# Patient Record
Sex: FEMALE | Race: BLACK OR AFRICAN AMERICAN | NOT HISPANIC OR LATINO | URBAN - METROPOLITAN AREA
[De-identification: names, ages, dates, MRNs, and addresses within clinical notes are randomized per-mention and may not be internally consistent; named-entity substitution may affect disease eponyms.]

---

## 2021-12-09 ENCOUNTER — EMERGENCY (EMERGENCY)
Facility: HOSPITAL | Age: 36
LOS: 1 days | Discharge: HOME | End: 2021-12-09
Attending: EMERGENCY MEDICINE | Admitting: EMERGENCY MEDICINE
Payer: COMMERCIAL

## 2021-12-09 VITALS
RESPIRATION RATE: 18 BRPM | WEIGHT: 207.23 LBS | HEART RATE: 94 BPM | OXYGEN SATURATION: 100 % | TEMPERATURE: 99 F | DIASTOLIC BLOOD PRESSURE: 80 MMHG | SYSTOLIC BLOOD PRESSURE: 119 MMHG

## 2021-12-09 PROCEDURE — 99236 HOSP IP/OBS SAME DATE HI 85: CPT

## 2021-12-09 NOTE — ED ADULT TRIAGE NOTE - CHIEF COMPLAINT QUOTE
Pt here for a PCR COVID test. Had a positive rapid COVID-19 test today. Pt came back from [place] 11/28. C/o congestion. Denies SOB, chest pain, headache, dizziness, nausea, vomiting, diarrhea. PMH pre-diabetic

## 2021-12-10 VITALS
DIASTOLIC BLOOD PRESSURE: 58 MMHG | HEART RATE: 77 BPM | RESPIRATION RATE: 20 BRPM | OXYGEN SATURATION: 100 % | SYSTOLIC BLOOD PRESSURE: 112 MMHG | TEMPERATURE: 99 F

## 2021-12-10 DIAGNOSIS — Z91.19 PATIENT'S NONCOMPLIANCE WITH OTHER MEDICAL TREATMENT AND REGIMEN: ICD-10-CM

## 2021-12-10 DIAGNOSIS — R73.9 HYPERGLYCEMIA, UNSPECIFIED: ICD-10-CM

## 2021-12-10 DIAGNOSIS — E11.9 TYPE 2 DIABETES MELLITUS WITHOUT COMPLICATIONS: ICD-10-CM

## 2021-12-10 DIAGNOSIS — Z20.822 CONTACT WITH AND (SUSPECTED) EXPOSURE TO COVID-19: ICD-10-CM

## 2021-12-10 LAB
A1C WITH ESTIMATED AVERAGE GLUCOSE RESULT: 10.7 % — HIGH (ref 4–5.6)
ALBUMIN SERPL ELPH-MCNC: 3.7 G/DL — SIGNIFICANT CHANGE UP (ref 3.3–5)
ALP SERPL-CCNC: 69 U/L — SIGNIFICANT CHANGE UP (ref 40–120)
ALT FLD-CCNC: 30 U/L — SIGNIFICANT CHANGE UP (ref 4–33)
ANION GAP SERPL CALC-SCNC: 12 MMOL/L — SIGNIFICANT CHANGE UP (ref 7–14)
ANION GAP SERPL CALC-SCNC: 13 MMOL/L — SIGNIFICANT CHANGE UP (ref 7–14)
AST SERPL-CCNC: 31 U/L — SIGNIFICANT CHANGE UP (ref 4–32)
B-OH-BUTYR SERPL-SCNC: 0.4 MMOL/L — SIGNIFICANT CHANGE UP (ref 0–0.4)
B-OH-BUTYR SERPL-SCNC: 0.6 MMOL/L — HIGH (ref 0–0.4)
BASOPHILS # BLD AUTO: 0.02 K/UL — SIGNIFICANT CHANGE UP (ref 0–0.2)
BASOPHILS NFR BLD AUTO: 0.3 % — SIGNIFICANT CHANGE UP (ref 0–2)
BILIRUB SERPL-MCNC: 0.4 MG/DL — SIGNIFICANT CHANGE UP (ref 0.2–1.2)
BLOOD GAS VENOUS COMPREHENSIVE RESULT: SIGNIFICANT CHANGE UP
BLOOD GAS VENOUS COMPREHENSIVE RESULT: SIGNIFICANT CHANGE UP
BUN SERPL-MCNC: 7 MG/DL — SIGNIFICANT CHANGE UP (ref 7–23)
BUN SERPL-MCNC: 9 MG/DL — SIGNIFICANT CHANGE UP (ref 7–23)
CALCIUM SERPL-MCNC: 8.4 MG/DL — SIGNIFICANT CHANGE UP (ref 8.4–10.5)
CALCIUM SERPL-MCNC: 9.1 MG/DL — SIGNIFICANT CHANGE UP (ref 8.4–10.5)
CHLORIDE SERPL-SCNC: 102 MMOL/L — SIGNIFICANT CHANGE UP (ref 98–107)
CHLORIDE SERPL-SCNC: 97 MMOL/L — LOW (ref 98–107)
CO2 SERPL-SCNC: 22 MMOL/L — SIGNIFICANT CHANGE UP (ref 22–31)
CO2 SERPL-SCNC: 23 MMOL/L — SIGNIFICANT CHANGE UP (ref 22–31)
CREAT SERPL-MCNC: 0.63 MG/DL — SIGNIFICANT CHANGE UP (ref 0.5–1.3)
CREAT SERPL-MCNC: 0.67 MG/DL — SIGNIFICANT CHANGE UP (ref 0.5–1.3)
EOSINOPHIL # BLD AUTO: 0.13 K/UL — SIGNIFICANT CHANGE UP (ref 0–0.5)
EOSINOPHIL NFR BLD AUTO: 1.7 % — SIGNIFICANT CHANGE UP (ref 0–6)
ESTIMATED AVERAGE GLUCOSE: 260 — SIGNIFICANT CHANGE UP
GLUCOSE SERPL-MCNC: 241 MG/DL — HIGH (ref 70–99)
GLUCOSE SERPL-MCNC: 249 MG/DL — HIGH (ref 70–99)
HCT VFR BLD CALC: 42 % — SIGNIFICANT CHANGE UP (ref 34.5–45)
HGB BLD-MCNC: 13.9 G/DL — SIGNIFICANT CHANGE UP (ref 11.5–15.5)
IANC: 3.44 K/UL — SIGNIFICANT CHANGE UP (ref 1.5–8.5)
IMM GRANULOCYTES NFR BLD AUTO: 0.3 % — SIGNIFICANT CHANGE UP (ref 0–1.5)
LYMPHOCYTES # BLD AUTO: 3 K/UL — SIGNIFICANT CHANGE UP (ref 1–3.3)
LYMPHOCYTES # BLD AUTO: 40.4 % — SIGNIFICANT CHANGE UP (ref 13–44)
MCHC RBC-ENTMCNC: 29 PG — SIGNIFICANT CHANGE UP (ref 27–34)
MCHC RBC-ENTMCNC: 33.1 GM/DL — SIGNIFICANT CHANGE UP (ref 32–36)
MCV RBC AUTO: 87.7 FL — SIGNIFICANT CHANGE UP (ref 80–100)
MONOCYTES # BLD AUTO: 0.82 K/UL — SIGNIFICANT CHANGE UP (ref 0–0.9)
MONOCYTES NFR BLD AUTO: 11 % — SIGNIFICANT CHANGE UP (ref 2–14)
NEUTROPHILS # BLD AUTO: 3.44 K/UL — SIGNIFICANT CHANGE UP (ref 1.8–7.4)
NEUTROPHILS NFR BLD AUTO: 46.3 % — SIGNIFICANT CHANGE UP (ref 43–77)
NRBC # BLD: 0 /100 WBCS — SIGNIFICANT CHANGE UP
NRBC # FLD: 0 K/UL — SIGNIFICANT CHANGE UP
PLATELET # BLD AUTO: 236 K/UL — SIGNIFICANT CHANGE UP (ref 150–400)
POTASSIUM SERPL-MCNC: 3.7 MMOL/L — SIGNIFICANT CHANGE UP (ref 3.5–5.3)
POTASSIUM SERPL-MCNC: 4.9 MMOL/L — SIGNIFICANT CHANGE UP (ref 3.5–5.3)
POTASSIUM SERPL-SCNC: 3.7 MMOL/L — SIGNIFICANT CHANGE UP (ref 3.5–5.3)
POTASSIUM SERPL-SCNC: 4.9 MMOL/L — SIGNIFICANT CHANGE UP (ref 3.5–5.3)
PROT SERPL-MCNC: 6.9 G/DL — SIGNIFICANT CHANGE UP (ref 6–8.3)
RBC # BLD: 4.79 M/UL — SIGNIFICANT CHANGE UP (ref 3.8–5.2)
RBC # FLD: 12 % — SIGNIFICANT CHANGE UP (ref 10.3–14.5)
SARS-COV-2 RNA SPEC QL NAA+PROBE: DETECTED
SODIUM SERPL-SCNC: 133 MMOL/L — LOW (ref 135–145)
SODIUM SERPL-SCNC: 136 MMOL/L — SIGNIFICANT CHANGE UP (ref 135–145)
WBC # BLD: 7.43 K/UL — SIGNIFICANT CHANGE UP (ref 3.8–10.5)
WBC # FLD AUTO: 7.43 K/UL — SIGNIFICANT CHANGE UP (ref 3.8–10.5)

## 2021-12-10 PROCEDURE — 99245 OFF/OP CONSLTJ NEW/EST HI 55: CPT

## 2021-12-10 RX ORDER — INSULIN LISPRO 100/ML
6 VIAL (ML) SUBCUTANEOUS
Refills: 0 | Status: DISCONTINUED | OUTPATIENT
Start: 2021-12-10 | End: 2021-12-13

## 2021-12-10 RX ORDER — METFORMIN HYDROCHLORIDE 850 MG/1
1 TABLET ORAL
Qty: 60 | Refills: 0
Start: 2021-12-10 | End: 2022-01-08

## 2021-12-10 RX ORDER — IBUPROFEN 200 MG
600 TABLET ORAL ONCE
Refills: 0 | Status: COMPLETED | OUTPATIENT
Start: 2021-12-10 | End: 2021-12-10

## 2021-12-10 RX ORDER — DEXTROSE 50 % IN WATER 50 %
25 SYRINGE (ML) INTRAVENOUS ONCE
Refills: 0 | Status: DISCONTINUED | OUTPATIENT
Start: 2021-12-10 | End: 2021-12-13

## 2021-12-10 RX ORDER — SODIUM CHLORIDE 9 MG/ML
1000 INJECTION, SOLUTION INTRAVENOUS
Refills: 0 | Status: DISCONTINUED | OUTPATIENT
Start: 2021-12-10 | End: 2021-12-13

## 2021-12-10 RX ORDER — IBUPROFEN 200 MG
1 TABLET ORAL
Qty: 30 | Refills: 0
Start: 2021-12-10

## 2021-12-10 RX ORDER — GLIMEPIRIDE 1 MG
1 TABLET ORAL
Qty: 30 | Refills: 0
Start: 2021-12-10 | End: 2022-01-08

## 2021-12-10 RX ORDER — GLUCAGON INJECTION, SOLUTION 0.5 MG/.1ML
1 INJECTION, SOLUTION SUBCUTANEOUS ONCE
Refills: 0 | Status: DISCONTINUED | OUTPATIENT
Start: 2021-12-10 | End: 2021-12-13

## 2021-12-10 RX ORDER — INSULIN HUMAN 100 [IU]/ML
3 INJECTION, SOLUTION SUBCUTANEOUS ONCE
Refills: 0 | Status: COMPLETED | OUTPATIENT
Start: 2021-12-10 | End: 2021-12-10

## 2021-12-10 RX ORDER — ACETAMINOPHEN 500 MG
975 TABLET ORAL ONCE
Refills: 0 | Status: COMPLETED | OUTPATIENT
Start: 2021-12-10 | End: 2021-12-10

## 2021-12-10 RX ORDER — SODIUM CHLORIDE 9 MG/ML
1000 INJECTION INTRAMUSCULAR; INTRAVENOUS; SUBCUTANEOUS ONCE
Refills: 0 | Status: COMPLETED | OUTPATIENT
Start: 2021-12-10 | End: 2021-12-10

## 2021-12-10 RX ORDER — INSULIN GLARGINE 100 [IU]/ML
15 INJECTION, SOLUTION SUBCUTANEOUS ONCE
Refills: 0 | Status: COMPLETED | OUTPATIENT
Start: 2021-12-10 | End: 2021-12-10

## 2021-12-10 RX ORDER — DEXTROSE 50 % IN WATER 50 %
12.5 SYRINGE (ML) INTRAVENOUS ONCE
Refills: 0 | Status: DISCONTINUED | OUTPATIENT
Start: 2021-12-10 | End: 2021-12-13

## 2021-12-10 RX ORDER — DEXTROSE 50 % IN WATER 50 %
15 SYRINGE (ML) INTRAVENOUS ONCE
Refills: 0 | Status: DISCONTINUED | OUTPATIENT
Start: 2021-12-10 | End: 2021-12-13

## 2021-12-10 RX ADMIN — INSULIN HUMAN 3 UNIT(S): 100 INJECTION, SOLUTION SUBCUTANEOUS at 02:19

## 2021-12-10 RX ADMIN — SODIUM CHLORIDE 1000 MILLILITER(S): 9 INJECTION INTRAMUSCULAR; INTRAVENOUS; SUBCUTANEOUS at 01:48

## 2021-12-10 RX ADMIN — Medication 6 UNIT(S): at 12:01

## 2021-12-10 RX ADMIN — INSULIN GLARGINE 15 UNIT(S): 100 INJECTION, SOLUTION SUBCUTANEOUS at 05:01

## 2021-12-10 RX ADMIN — Medication 6 UNIT(S): at 08:58

## 2021-12-10 RX ADMIN — Medication 975 MILLIGRAM(S): at 07:03

## 2021-12-10 RX ADMIN — Medication 600 MILLIGRAM(S): at 12:01

## 2021-12-10 RX ADMIN — Medication 975 MILLIGRAM(S): at 06:04

## 2021-12-10 RX ADMIN — Medication 600 MILLIGRAM(S): at 13:00

## 2021-12-10 NOTE — ED PROVIDER NOTE - CARE PLAN
1 Principal Discharge DX:	Diabetes mellitus with hyperglycemia  Secondary Diagnosis:	2019 novel coronavirus disease (COVID-19)

## 2021-12-10 NOTE — ED CDU PROVIDER DISPOSITION NOTE - PATIENT PORTAL LINK FT
You can access the FollowMyHealth Patient Portal offered by [facility] by registering at the following website: [email or web address]. By joining [facility]’s FollowMyHealth portal, you will also be able to view your health information using other applications (apps) compatible with our system.

## 2021-12-10 NOTE — ED ADULT NURSE NOTE - OBJECTIVE STATEMENT
received report form night RN. virtual CDU received in intake room 7. pt is A+Ox3, ambulatory at baseline. PMH of DM II, states she checks her FS daily. presented to ED C/O recent positive rapid COVID test, states she is here for a PCR COVID test/ recent travel to [place] 11/28. C/o congestion. diminished lung sounds, infrequent dry cough noted. VSS, Denies SOB and chest pain, RR even and unlabored. denies abdominal pain, N/V/D/C. pt is also C/O right sided tooth pain and gum swelling. no edema noted, no breathing obstruction noted. left AC 20g noted. no s/s of distress noted at this time. will continue to monitor.

## 2021-12-10 NOTE — ED CDU PROVIDER DISPOSITION NOTE - NSFOLLOWUPINSTRUCTIONS_ED_ALL_ED_FT
You came to the ER for a COVID test. Due to your high fingerstick, it was determined you have uncontrolled diabetes. You were seen by the endocrinology team here, who recommend you take metformin 1000 mg 2x a day and glimepiride 2 mg daily with breakfast.  Please follow up with an endocrinologist.    Follow a low carb low sugar diet. Continue to take all antidiabetic medications/insulin as prescribed. Follow up with your Primary Care Doctor regularly for blood sugar/A1c checks. Be sure to see an eye doctor and foot doctor on an annual basis.

## 2021-12-10 NOTE — ED PROVIDER NOTE - OBJECTIVE STATEMENT
37 Y/O F PMH DM only on Metformin 500 once daily requests COVID-19 testing. Pt states that she was + on a rapid test earlier today. States she has nasal congestion, denies any other symptoms. States a PCR was also obtained at that time but the results are not yet available. Pt also endorses polyuria and polydipsia for weeks. BG at triage is nearly 300. Pt denies any other sx or acute complaints.

## 2021-12-10 NOTE — ED PROVIDER NOTE - ATTENDING CONTRIBUTION TO CARE
35yo F fully vaccinated for COVID19 with PMHX DM on Metformin 500mg QD p/w nasal congestion and mild cough, recent travel to [place] and +rapid COVID test today, requesting PCR testing for COVID19. No sob or chest pains. Of note finger stick glucose elevated to 298 in triage    General: Patient alert in no apparent distress  Skin: Dry and intact  HEENT: Head atraumatic. Oral mucosa moist.   Eyes: Conjunctiva normal  Cardiac: Regular rhythm and rate. No pretibial edema b/l  Respiratory: Lungs clear b/l and symmetric. No respiratory distress. Able to speak in complete sentences.  Gastrointestinal: Abdomen soft, nondistended, nontender  Musculoskeletal: Moves all extremities spontaneously  Neurological: alert and oriented to person, place, and time  Psychiatric: Calm and cooperative     a/p  mild uri sx, likely covid insetting of +rapid test  will do pcr test as requested  labs with HgbA1C ordered d/t incr finger stick and showing A1C 10%  plan to dispo to CDU for endocrine consultation and medication optimization

## 2021-12-10 NOTE — ED CDU PROVIDER INITIAL DAY NOTE - OBJECTIVE STATEMENT
35 Y/O F PMH DM only on Metformin 500 once daily requests COVID-19 testing. Pt states that she was + on a rapid test earlier today. States she has nasal congestion, denies any other symptoms. States a PCR was also obtained at that time but the results are not yet available. Pt also endorses polyuria and polydipsia for weeks. BG at triage is nearly 300. Pt received insulin and IV fluids while in the ED. Plan is CDU for continued glycemic control, Endocrinology consultation. Pt is currently stable.

## 2021-12-10 NOTE — ED ADULT NURSE NOTE - CHIEF COMPLAINT QUOTE
Pt here for a PCR COVID test. Had a positive rapid COVID-19 test today. Pt came back from [place] 11/28. C/o congestion. Denies SOB, chest pain, headache, dizziness, nausea, vomiting, diarrhea. PMH pre-diabetic

## 2021-12-10 NOTE — ED CDU PROVIDER INITIAL DAY NOTE - ATTENDING CONTRIBUTION TO CARE
37 Y/O F PMH DM only on Metformin 500 once daily requests COVID-19 testing. Pt states that she was + on a rapid test earlier today. States she has nasal congestion. Pt also hyperglycemic with polyuria and polydipsia.     Dispo- CDU for initiation of insulin, endocrinology evaluation and management of acute hyperglycemia.

## 2021-12-10 NOTE — ED ADULT NURSE REASSESSMENT NOTE - NS ED NURSE REASSESS COMMENT FT1
Pt reports feeling weak and lethargic, presenting for covid test. Pt denies CP or SOB saturating well on room air.

## 2021-12-10 NOTE — ED ADULT NURSE REASSESSMENT NOTE - NS ED NURSE REASSESS COMMENT FT1
pt is comfortably in stretcher. diabetes education performed. teach back used. pt educated on self injection, proper site, cleaning site, and injecting. pt correctly performed, self injection. will continue to monitor.

## 2021-12-10 NOTE — ED CDU PROVIDER INITIAL DAY NOTE - PROGRESS NOTE DETAILS
Dr. Bess:  I performed a face to face bedside interview with patient regarding history of present illness, review of symptoms and past medical history. I completed an independent physical exam.  I have discussed patient's plan of care with PA.   I agree with note as stated above, having amended the EMR as needed to reflect my findings.   This includes HISTORY OF PRESENT ILLNESS, HIV, PAST MEDICAL/SURGICAL/FAMILY/SOCIAL HISTORY, ALLERGIES AND HOME MEDICATIONS, REVIEW OF SYSTEMS, PHYSICAL EXAM, and any PROGRESS NOTES during the time I functioned as the attending physician for this patient.    36F h/o DM on Metformin 500mg daily presented to ED requesting Covid19 testing.  Tested positive earlier at urgent care with rapid swab.  Incidentally noted to be hyperglycemic with elevated A1C.  Covid PCR positive in ED.  No acute complaints this morning except pain to right lower wisdom tooth (told previously that she needs tooth removed because impinging on molars).   Exam: nad, rrr, ctab, abd soft ntnd, no evidence of dental abscess on exam  A/P:  covid19, non-hypoxic, supportive care; uncontrolled hyperglycemia, appreciate endocrine recs; tooth pain, f/u dental outpatient, no evidence of acute infection

## 2021-12-10 NOTE — ED CDU PROVIDER DISPOSITION NOTE - ATTENDING CONTRIBUTION TO CARE
Dr. Bess:  I performed a face to face bedside interview with patient regarding history of present illness, review of symptoms and past medical history. I completed an independent physical exam.  I have discussed patient's plan of care with PA.   I agree with note as stated above, having amended the EMR as needed to reflect my findings.   This includes HISTORY OF PRESENT ILLNESS, HIV, PAST MEDICAL/SURGICAL/FAMILY/SOCIAL HISTORY, ALLERGIES AND HOME MEDICATIONS, REVIEW OF SYSTEMS, PHYSICAL EXAM, and any PROGRESS NOTES during the time I functioned as the attending physician for this patient.

## 2021-12-10 NOTE — CONSULT NOTE ADULT - SUBJECTIVE AND OBJECTIVE BOX
Reason For Consult: Hyperglycemia     HPI:  36 y.o Female who works at [employer] with type 2 DM (dx about 4 months ago with A1c of 7% on metformin 500 mg BID) admitted in CDU for COVID.  Endocrine team consulted for hyperglycemia and A1c of 10.7%.      Patient had been pre-diabetic for almost 2 years before being dx with type 2 DM by PCP 4 months ago.  A1c at the time of dx was around 7%.      She has been on Metformin 500 mg po BID, tolerating the medication and complaint with medications.   Checks her BG once a day in the morning and BG has been between 200-400.  Has no hypoglycemic episodes.    There is a family history of type 2 DM in mother.    Complications: -cva, - cad, -neuropathy, -retinopathy   Weight: stable   Diet: doesn't follow a diabetic diet, drinks ginger ale regularly and soda occasionally.      ROS: polyuria, polydipsia,     Reports nasal congestion, denies any other symptoms.     In the ED: BG at triage is nearly 300. Pt received insulin and IV fluids while in the ED.    PAST MEDICAL & SURGICAL HISTORY:  Diabetes  No significant past surgical history    FAMILY HISTORY:  FH: type 2 diabetes (Mother)    Social History: drinks occasionally denies tobacco use   works at [employer]     MEDICATIONS  (STANDING):  dextrose 40% Gel 15 Gram(s) Oral once  dextrose 5%. 1000 milliLiter(s) (50 mL/Hr) IV Continuous <Continuous>  dextrose 5%. 1000 milliLiter(s) (100 mL/Hr) IV Continuous <Continuous>  dextrose 50% Injectable 25 Gram(s) IV Push once  dextrose 50% Injectable 12.5 Gram(s) IV Push once  dextrose 50% Injectable 25 Gram(s) IV Push once  glucagon  Injectable 1 milliGRAM(s) IntraMuscular once  insulin lispro Injectable (ADMELOG) 6 Unit(s) SubCutaneous before breakfast  insulin lispro Injectable (ADMELOG) 6 Unit(s) SubCutaneous before lunch  insulin lispro Injectable (ADMELOG) 6 Unit(s) SubCutaneous before dinner    Allergies  No Known Allergies    Review of Systems:  Eyes: No blurry vision  Neuro: No tremors  HEENT: No pain  Cardiovascular: No chest pain, palpitations  Respiratory: No SOB, no cough  GI: No nausea, vomiting, abdominal pain  : No dysuria  Skin: no rash  Psych: no depression  Endocrine: + polyuria, polydipsia  Hem/lymph: no swelling  Osteoporosis: no fractures  ALL OTHER SYSTEMS REVIEWED AND NEGATIVE    PHYSICAL EXAM:  VITALS: T(C): 37.2 (12-10-21 @ 12:18)  T(F): 98.9 (12-10-21 @ 12:18), Max: 99 (12-10-21 @ 04:46)  HR: 87 (12-10-21 @ 12:18) (78 - 94)  BP: 124/62 (12-10-21 @ 12:18) (102/78 - 124/62)  RR:  (18 - 20)  SpO2:  (100% - 100%)  Wt(kg): --  GENERAL: well-groomed, well-developed  EYES: No proptosis, no lid lag, anicteric  HEENT:  Atraumatic, Normocephalic, moist mucous membranes  THYROID: Normal size, no palpable nodules  SKIN: Dry, intact, No rashes or lesions  MUSCULOSKELETAL: Full range of motion, normal strength  PSYCH: Alert and oriented x 3, normal affect, normal mood  CUSHING'S SIGNS: no striae    POCT Blood Glucose.: 165 mg/dL (12-10-21 @ 12:00)  POCT Blood Glucose.: 195 mg/dL (12-10-21 @ 08:55)  POCT Blood Glucose.: 190 mg/dL (12-10-21 @ 04:42)  POCT Blood Glucose.: 298 mg/dL (12-09-21 @ 23:03)                        13.9   7.43  )-----------( 236      ( 10 Dec 2021 06:00 )             42.0     12-10    136  |  102  |  7   ----------------------------<  241<H>  3.7   |  22  |  0.63    EGFR if : 133  EGFR if non : 115    Ca    8.4      12-10    TPro  6.9  /  Alb  3.7  /  TBili  0.4  /  DBili  x   /  AST  31  /  ALT  30  /  AlkPhos  69  12-10

## 2021-12-10 NOTE — ED ADULT NURSE REASSESSMENT NOTE - NS ED NURSE REASSESS COMMENT FT1
pt is resting comfortably in stretcher. denies chest pain and SOB, RR even and unlabored. clear lung sounds osculated. given meds as ordered, pt given breakfast. will continue to monitor.

## 2021-12-10 NOTE — CONSULT NOTE ADULT - ASSESSMENT
36 y.o Female who works at [employer] with type 2 DM (dx about 4 months ago with A1c of 7% on metformin 500 mg BID) admitted in CDU for COVID.  Endocrine team consulted for hyperglycemia and A1c of 10.7%.      Stress hyperglycemia   Type 2 DM, uncontrolled   Noncompliant with diet   - A1c 10.7% (12/2021), A1c goal < 7%   - Home medications: Metformin 500 mg po BID   - Patient is currently on Lantus 15 U HS and Lispro 6 U TID with meals.  Lispro just started with breakfast.   - , c/w Lantus 15 U HS   - not enough data available to adjust Lispro dose.  C/W 6 U Lispro for now.    - Hold Lispro if not eating or NPO  - moderate scale SS with meals and low dose SS at night.  Change it q6H if NPO   - Based upon my review of the above information, I recommend the following:   - please monitor fingerstick blood glucose at least 4 times a day to avoid insulin toxicity, hypoglycemia; Blood glucose target while hospitalized 140-180 mg/dL  - Carbohydrate controlled diet, no concentrated sweets  - Counseled on need for medication and diet adherence to avoid new complications.   - will consult dietician     # Dyslipidemia: can check lipid panel as OP   # Hypertension: BP goal < 130/80    DM Discharge planning  - Please contact diabetes team prior to patient's discharge for finalized regimen. Please allow 24-48 hours for discharge planning.   - pt is willing to make changes to diet and exercise.  She has good kidney function, will give her a trial on PO medications for about 2 months with f/u at [address] [place] [address].   - Possible d/c recs: metformin 1000 mg po BID, glimepiride 2 mg po daily with breakfast.

## 2021-12-10 NOTE — ED CDU PROVIDER DISPOSITION NOTE - CLINICAL COURSE
37 Y/O F PMH DM only on Metformin 500 once daily requests COVID-19 testing. BG at triage is nearly 300. Pt received insulin and IV fluids while in the ED. Placed in CDU for continued glycemic control, Endocrinology consultation.

## 2021-12-10 NOTE — ED CDU PROVIDER INITIAL DAY NOTE - MEDICAL DECISION MAKING DETAILS
37 Y/O F PMH DM only on Metformin 500 once daily requests COVID-19 testing. Pt states that she was + on a rapid test earlier today. States she has nasal congestion. Pt also hyperglycemic with polyuria and polydypsia. Will observe in CDU for initiation of insulin, endocrinology evaluation and management of acute hyperglycemia. Pt is agreeable to plan, no acute distress.

## 2021-12-10 NOTE — ED ADULT NURSE REASSESSMENT NOTE - NS ED NURSE REASSESS COMMENT FT1
pt is resting comfortably in stretcher. VSS, denies chest pain and SOB, RR even and unlabored. no s/s of distress at this time.

## 2021-12-10 NOTE — ED PROVIDER NOTE - CLINICAL SUMMARY MEDICAL DECISION MAKING FREE TEXT BOX
37 Y/O F PMH DM only on Metformin 500 once daily requests COVID-19 testing. Pt states that she was + on a rapid test earlier today. States she has nasal congestion. Pt also hyperglycemic with polyuria and polydypsia. Will observe in CDU for initiation of insulin, endocrinology evaluation and management of acute hyperglycemia. 35 Y/O F PMH DM only on Metformin 500 once daily requests COVID-19 testing. Pt states that she was + on a rapid test earlier today. States she has nasal congestion. Pt also hyperglycemic with polyuria and polydipsia. Will observe in CDU for initiation of insulin, endocrinology evaluation and management of acute hyperglycemia.

## 2022-03-04 ENCOUNTER — EMERGENCY (EMERGENCY)
Facility: HOSPITAL | Age: 37
LOS: 1 days | Discharge: HOME | End: 2022-03-04
Attending: STUDENT IN AN ORGANIZED HEALTH CARE EDUCATION/TRAINING PROGRAM | Admitting: STUDENT IN AN ORGANIZED HEALTH CARE EDUCATION/TRAINING PROGRAM
Payer: OTHER MISCELLANEOUS

## 2022-03-04 VITALS
OXYGEN SATURATION: 100 % | RESPIRATION RATE: 16 BRPM | DIASTOLIC BLOOD PRESSURE: 77 MMHG | SYSTOLIC BLOOD PRESSURE: 128 MMHG | TEMPERATURE: 98 F | HEART RATE: 77 BPM

## 2022-03-04 PROCEDURE — 99284 EMERGENCY DEPT VISIT MOD MDM: CPT

## 2022-03-04 NOTE — ED PROVIDER NOTE - ATTENDING CONTRIBUTION TO CARE
I have personally performed a face to face medical and diagnostic evaluation of the patient. I have discussed with and reviewed the ACP's note and agree with the History, ROS, Physical Exam and MDM unless otherwise indicated. A brief summary of my personal evaluation and impression can be found below.    Walter ARSHAD: Please see the HPI, ROS, PE and MDM as authored by me.

## 2022-03-04 NOTE — ED PROVIDER NOTE - PATIENT PORTAL LINK FT
You can access the FollowMyHealth Patient Portal offered by [facility] by registering at the following website: [email or web address]. By joining [facility]’s FollowMyHealth portal, you will also be able to view your health information using other applications (apps) compatible with our system.

## 2022-03-04 NOTE — ED PROVIDER NOTE - NSFOLLOWUPINSTRUCTIONS_ED_ALL_ED_FT
Thank you for visiting our Emergency Department, it has been a pleasure taking part in your healthcare.    Your discharge diagnosis is: blood exposure, arm pain  Please take all discharge medications as indicated below:  Take Motrin/Tylenol for pain as needed, please follow instructions on manufacturers label. If you have any questions please consult a pharmacist or your PMD.  Please follow up with your PMD within x48 hours.  Please follow up with Infectious Disease physicians within x48 hours.  A copy of resulted labs, imaging, and findings have been provided to you.   You have had a detailed discussion with your provider regarding your diagnosis, care management and discharge planning including, but not limited to: return precautions, follow up visits with existing or new providers, new prescriptions and/or medication changes, wound and/or splint/cast care or other care   aspects specific to your diagnosis and treatment. You have been given the opportunity to have your questions answered. At this time you have been deemed stable and fit for discharge.  Return precautions to the Emergency Department include but are not limited to: unrelenting nausea, vomiting, fever, chills, chest pain, shortness of breath, dizziness, chest or abdominal pain, worsening back pain, syncope, blood in urine or stool, headache that doesn't resolve, numbness or tingling, loss of sensation, loss of motor function, or any other concerning symptoms. Follow up with [facility] in 2-3 days [phone].  Clean affected area with soap and water.  Return to the ER for any persistent/worsening or new symptoms fevers, chills, redness, weakness, numbness or any concerning symptoms.      Thank you for visiting our Emergency Department, it has been a pleasure taking part in your healthcare.    Your discharge diagnosis is: blood exposure, arm pain  Please take all discharge medications as indicated below:  Take Motrin/Tylenol for pain as needed, please follow instructions on manufacturers label. If you have any questions please consult a pharmacist or your PMD.  Please follow up with your PMD within x48 hours.  Please follow up with Infectious Disease physicians within x48 hours.  A copy of resulted labs, imaging, and findings have been provided to you.   You have had a detailed discussion with your provider regarding your diagnosis, care management and discharge planning including, but not limited to: return precautions, follow up visits with existing or new providers, new prescriptions and/or medication changes, wound and/or splint/cast care or other care   aspects specific to your diagnosis and treatment. You have been given the opportunity to have your questions answered. At this time you have been deemed stable and fit for discharge.  Return precautions to the Emergency Department include but are not limited to: unrelenting nausea, vomiting, fever, chills, chest pain, shortness of breath, dizziness, chest or abdominal pain, worsening back pain, syncope, blood in urine or stool, headache that doesn't resolve, numbness or tingling, loss of sensation, loss of motor function, or any other concerning symptoms.

## 2022-03-04 NOTE — ED PROVIDER NOTE - OBJECTIVE STATEMENT
Walter ARSHAD: 36F hx of DM presents with a cc of c/f blood bourne exposure while at work, reports she was taking a finger stick on a pt when she applied pressure to tip, a small amount of blood splashed on her face, she is concerned it may have went into her eyes, unsure of which eye, but she blinked and thinks she may have prevented any significant exposure. Status of source pt is unknown, per pt team on floor is drawing labs for pt. Had at length r/b/a discussion regarding PEP, at this time pt declines. Also notes chronic RUE arm pain , worse w flexion that she would like evaluated, Denies numbness, tingling or loss of sensation. Denies loss of motor function. Reports pain is sometimes worse w flexion and over use, no trauma. Is not worse today, localized over AC area.  No prior hx of dvt or Pe no swelling, no rash no redness no fever. Able to range w/o issue, thinks is sometimes worse after use of exercise machine at home. Denies n/v/f/c/cp/sob. Denies headache, syncope, lightheadedness, dizziness. Denies chest palpitations, abdominal pain. Denies edema. Denies dysuria, hematuria, BRBPR, tarry stools, diarrhea, constipation. Denies changes in vision. No painful EOM vision is at baseline, she irrigated her eyes after the event. event occurred at ~5pm today.

## 2022-03-04 NOTE — ED ADULT TRIAGE NOTE - CHIEF COMPLAINT QUOTE
pt is employee on [employer], splashed in eye with blood while performing fingerstick on patient. Pt also wants to be seen for right inner arm pain x 1 month.

## 2022-03-04 NOTE — ED PROVIDER NOTE - PHYSICAL EXAMINATION
Walter ARSHAD:  VITALS: Initial triage and subsequent vitals have been reviewed by me.  GEN APPEARANCE: WDWN, alert and cooperative, non-toxic appearing and in NAD  HEAD: Atraumatic, normocephalic   EYES: PERRLa, EOMI, vision grossly intact.   EARS: Gross hearing intact.   NOSE: No nasal discharge, no external evidence of epistaxis.   NECK: Supple  CV: RRR, S1S2, no c/r/m/g. No cyanosis or pallor. Extremities warm, well perfused. Cap refill <2 seconds. No bruits.   LUNGS: CTAB. No wheezing. No rales. No rhonchi. No diminished breath sounds.   ABDOMEN: Soft, NTND. No guarding or rebound. No masses.   MSK/EXT: Spine appears normal, no spine point tenderness. No CVA ttp. Normal muscular development. Pelvis stable. No obvious joint or bony deformity, no peripheral edema. RUE wnl no rash swelling, DVNI +2 radial pulse. hand exam normal.   NEURO: Alert, follows commands. Weight bearing normal. Speech normal. Sensation and motor normal x4 extremities.   SKIN: Normal color for race, warm, dry and intact. No evidence of rash.  PSYCH: Normal mood and affect.

## 2022-03-04 NOTE — ED PROVIDER NOTE - CLINICAL SUMMARY MEDICAL DECISION MAKING FREE TEXT BOX
Walter ARSHAD: 36F hx of DM presents with a cc of c/f blood bourne exposure while at work, reports she was taking a finger stick on a pt when she applied pressure to tip, a small amount of blood splashed on her face, she is concerned it may have went into her eyes, unsure of which eye, but she blinked and thinks she may have prevented any significant exposure. Status of source pt is unknown, per pt team on floor is drawing labs for pt. Had at length r/b/a discussion regarding PEP, at this time pt declines. Also notes chronic RUE arm pain , worse w flexion that she would like evaluated, Denies numbness, tingling or loss of sensation. Denies loss of motor function. Reports pain is sometimes worse w flexion and over use, no trauma. Is not worse today, localized over AC area.  exam vss non toxic PE as above ddx c/f likely blood exposure, will complete exposure packet, pt declines PEP at this time, arm pain likely msk related, reassuring exam, exam is not c/w acute infectious process or dvt, no ttp, low c/f fx, will complete packet, id follow up, strict return precautions.

## 2022-03-05 PROBLEM — E11.9 TYPE 2 DIABETES MELLITUS WITHOUT COMPLICATIONS: Chronic | Status: ACTIVE | Noted: 2021-12-10

## 2022-07-07 ENCOUNTER — EMERGENCY (EMERGENCY)
Facility: HOSPITAL | Age: 37
LOS: 1 days | Discharge: HOME | End: 2022-07-07
Admitting: EMERGENCY MEDICINE

## 2022-07-07 VITALS
HEART RATE: 100 BPM | RESPIRATION RATE: 18 BRPM | SYSTOLIC BLOOD PRESSURE: 142 MMHG | OXYGEN SATURATION: 100 % | DIASTOLIC BLOOD PRESSURE: 77 MMHG | TEMPERATURE: 97 F

## 2022-07-07 PROCEDURE — 99284 EMERGENCY DEPT VISIT MOD MDM: CPT

## 2022-07-07 RX ORDER — LIDOCAINE 4 G/100G
1 CREAM TOPICAL ONCE
Refills: 0 | Status: COMPLETED | OUTPATIENT
Start: 2022-07-07 | End: 2022-07-08

## 2022-07-07 RX ORDER — KETOROLAC TROMETHAMINE 30 MG/ML
30 SYRINGE (ML) INJECTION ONCE
Refills: 0 | Status: DISCONTINUED | OUTPATIENT
Start: 2022-07-07 | End: 2022-07-08

## 2022-07-07 NOTE — ED ADULT TRIAGE NOTE - CHIEF COMPLAINT QUOTE
Pt tripped and  fell two days ago and is c/o pain to right elbow and right knee. Pain is worse with activities. c/o swelling to above elbow. Denies hitting head or LOC. Has not taken any pain medicine at home. PMH of Type 2 DM and is on Metformin and glipizide.

## 2022-07-08 VITALS
TEMPERATURE: 98 F | HEART RATE: 87 BPM | RESPIRATION RATE: 16 BRPM | SYSTOLIC BLOOD PRESSURE: 138 MMHG | DIASTOLIC BLOOD PRESSURE: 75 MMHG | OXYGEN SATURATION: 100 %

## 2022-07-08 LAB — HCG UR QL: NEGATIVE — SIGNIFICANT CHANGE UP

## 2022-07-08 PROCEDURE — 73060 X-RAY EXAM OF HUMERUS: CPT | Mod: 26,RT

## 2022-07-08 PROCEDURE — 73070 X-RAY EXAM OF ELBOW: CPT | Mod: 26,RT

## 2022-07-08 PROCEDURE — 73562 X-RAY EXAM OF KNEE 3: CPT | Mod: 26,RT

## 2022-07-08 RX ADMIN — Medication 30 MILLIGRAM(S): at 02:22

## 2022-07-08 RX ADMIN — LIDOCAINE 1 PATCH: 4 CREAM TOPICAL at 02:18

## 2022-07-08 NOTE — ED ADULT NURSE NOTE - OBJECTIVE STATEMENT
Received in intake 10A with c/o pain in the right elbow and right knee. . Patient states she tripped and fell down 2 days ago. Denies hitting her head or LOC. Not in acute distress. Alert and oriented x 4, safety maintained. Patient refused to have meds taken unitill xray is completed. MD made aware.

## 2022-07-08 NOTE — ED PROVIDER NOTE - NSFOLLOWUPINSTRUCTIONS_ED_ALL_ED_FT
Take Tylenol 650mg every 6 hours and/or Ibuprofen 600mg every 6 hours as needed for pain control.    Follow up with your PMD within 24-48 hrs. Show copies of your reports given to you. Take all of your medications as previously prescribed.    If you have any new, worsened or concerning symptoms, please return to the emergency department immediately.

## 2022-07-08 NOTE — ED PROVIDER NOTE - OBJECTIVE STATEMENT
36 year old female with PMH of DM presents to the ED complaining of right knee pain and right elbow s/p fall 2 days ago. Pt states she was walking to her car, tripped and fell forward. She complains of worsened pain to the right knee with ambulation and right elbow. Denies head/neck injury or any other somatic complaints. Denies weakness, numbness or tingling sensation. Denies any other complaints.

## 2022-07-08 NOTE — ED ADULT NURSE NOTE - NSIMPLEMENTINTERV_GEN_ALL_ED
Implemented All Fall Risk Interventions:  [place] to call system. Call bell, personal items and telephone within reach. Instruct patient to call for assistance. Room bathroom lighting operational. Non-slip footwear when patient is off stretcher. Physically safe environment: no spills, clutter or unnecessary equipment. Stretcher in lowest position, wheels locked, appropriate side rails in place. Provide visual cue, wrist band, yellow gown, etc. Monitor gait and stability. Monitor for mental status changes and reorient to person, place, and time. Review medications for side effects contributing to fall risk. Reinforce activity limits and safety measures with patient and family.

## 2022-07-08 NOTE — ED PROVIDER NOTE - PATIENT PORTAL LINK FT
You can access the FollowMyHealth Patient Portal offered by [facility] by registering at the following website: [email or web address]. By joining [employer]’s FollowMyHealth portal, you will also be able to view your health information using other applications (apps) compatible with our system.

## 2022-07-08 NOTE — ED PROVIDER NOTE - CLINICAL SUMMARY MEDICAL DECISION MAKING FREE TEXT BOX
36 year old female with PMH of DM presents to the ED complaining of right knee pain and right elbow s/p fall 2 days ago. HD stable. Imp: right knee pain and right elbow s/p fall 2 days ago., r/o acute fractures. Plan for meds and antalgesics, reassess.

## 2023-03-11 ENCOUNTER — EMERGENCY (EMERGENCY)
Facility: HOSPITAL | Age: 38
LOS: 1 days | Discharge: HOME | End: 2023-03-11
Attending: STUDENT IN AN ORGANIZED HEALTH CARE EDUCATION/TRAINING PROGRAM | Admitting: STUDENT IN AN ORGANIZED HEALTH CARE EDUCATION/TRAINING PROGRAM
Payer: COMMERCIAL

## 2023-03-11 VITALS
SYSTOLIC BLOOD PRESSURE: 118 MMHG | RESPIRATION RATE: 18 BRPM | OXYGEN SATURATION: 100 % | HEART RATE: 77 BPM | TEMPERATURE: 98 F | DIASTOLIC BLOOD PRESSURE: 74 MMHG

## 2023-03-11 VITALS
TEMPERATURE: 99 F | RESPIRATION RATE: 18 BRPM | DIASTOLIC BLOOD PRESSURE: 75 MMHG | OXYGEN SATURATION: 99 % | SYSTOLIC BLOOD PRESSURE: 105 MMHG | HEART RATE: 80 BPM

## 2023-03-11 PROCEDURE — 99284 EMERGENCY DEPT VISIT MOD MDM: CPT

## 2023-03-11 PROCEDURE — 72220 X-RAY EXAM SACRUM TAILBONE: CPT | Mod: 26

## 2023-03-11 RX ORDER — KETOROLAC TROMETHAMINE 30 MG/ML
15 SYRINGE (ML) INJECTION ONCE
Refills: 0 | Status: DISCONTINUED | OUTPATIENT
Start: 2023-03-11 | End: 2023-03-11

## 2023-03-11 RX ADMIN — Medication 15 MILLIGRAM(S): at 16:27

## 2023-03-11 RX ADMIN — Medication 15 MILLIGRAM(S): at 15:57

## 2023-03-11 NOTE — ED PROVIDER NOTE - CLINICAL SUMMARY MEDICAL DECISION MAKING FREE TEXT BOX
37-year-old female with past medical history of diabetes presents to ED status post fall last night with tailbone pain. A/P Labs, imaging, medicate, reassess

## 2023-03-11 NOTE — ED ADULT NURSE NOTE - OBJECTIVE STATEMENT
Receive pt. in Intake room 15 alert and oriented x 4, presenting to the ER with complaints of lower back pain. Pt. stated "I fell off the stool today at home and landed on my lower back and it hurts a lot". Medicated as ordered. Pt. denies hitting her head or loosing consciousness.

## 2023-03-11 NOTE — ED PROVIDER NOTE - OBJECTIVE STATEMENT
37-year-old female with past medical history of diabetes presents to ED status post fall last night with tailbone pain.  Reports she was sitting on a stool and fell back directly onto her tailbone and has been having pain ever since.  Difficulty with sitting because of midline pain.  Denies urinary or bowel incontinence or retention, saddle anesthesia.  Denies any other injuries.  Denies LOC.  Tried taking ibuprofen last night without significant improvement of symptoms.  LMP February 2023.  Denies surgical history.  Denies allergies.

## 2023-03-11 NOTE — ED PROVIDER NOTE - PHYSICAL EXAMINATION
Gen: no acute distress, well appearing, awake, alert and oriented x 3  Cardiac: regular rate and rhythm, +S1S2  Pulm: Clear to auscultation bilaterally  Abd: soft, nontender, nondistended, no guarding  Back: neg CVA tender to palpation, +ttp coccyx without ecchymosis or skin changes  Extremity: no edema, no deformity, warm and well perfused, FROM all extremities    Neuro: awake, alert, oriented x 3, sensorimotor intact  Psych: normal affect

## 2023-03-11 NOTE — ED PROVIDER NOTE - PATIENT PORTAL LINK FT
You can access the FollowMyHealth Patient Portal offered by [facility] by registering at the following website: [email or web address]. By joining [employer]’s FollowMyHealth portal, you will also be able to view your health information using other applications (apps) compatible with our system.

## 2023-07-15 ENCOUNTER — EMERGENCY (EMERGENCY)
Facility: HOSPITAL | Age: 38
LOS: 1 days | Discharge: HOME | End: 2023-07-15
Attending: EMERGENCY MEDICINE | Admitting: EMERGENCY MEDICINE
Payer: OTHER MISCELLANEOUS

## 2023-07-15 VITALS
DIASTOLIC BLOOD PRESSURE: 78 MMHG | HEART RATE: 68 BPM | OXYGEN SATURATION: 100 % | SYSTOLIC BLOOD PRESSURE: 117 MMHG | RESPIRATION RATE: 16 BRPM

## 2023-07-15 VITALS
OXYGEN SATURATION: 100 % | DIASTOLIC BLOOD PRESSURE: 85 MMHG | SYSTOLIC BLOOD PRESSURE: 119 MMHG | TEMPERATURE: 98 F | RESPIRATION RATE: 18 BRPM | HEART RATE: 78 BPM

## 2023-07-15 PROCEDURE — 99284 EMERGENCY DEPT VISIT MOD MDM: CPT

## 2023-07-15 RX ORDER — IBUPROFEN 200 MG
600 TABLET ORAL ONCE
Refills: 0 | Status: COMPLETED | OUTPATIENT
Start: 2023-07-15 | End: 2023-07-15

## 2023-07-15 RX ORDER — ACETAMINOPHEN 500 MG
650 TABLET ORAL ONCE
Refills: 0 | Status: COMPLETED | OUTPATIENT
Start: 2023-07-15 | End: 2023-07-15

## 2023-07-15 RX ADMIN — Medication 650 MILLIGRAM(S): at 18:46

## 2023-07-15 RX ADMIN — Medication 600 MILLIGRAM(S): at 18:46

## 2023-07-15 NOTE — ED PROVIDER NOTE - CLINICAL SUMMARY MEDICAL DECISION MAKING FREE TEXT BOX
37-year-old female history of diabetes, presents after right third and fourth distal finger injury while at work.  Patient closed door with on her fingers.  Endorses pain to distal right third and fourth. Patient with cap refill <2 sec, full ROM of DIP, neurovascularly intact. Low concern for fracture at this time. Likely soft tissue injury. Offered patient xray and declined. Will provide pain control, discharge.

## 2023-07-15 NOTE — ED ADULT TRIAGE NOTE - CHIEF COMPLAINT QUOTE
pain and swelling to the 2nd, 3rd and 4th fingers after getting them caught in a door while working, PT is visibly uncomfortable. Hx of DM

## 2023-07-15 NOTE — ED PROVIDER NOTE - NSFOLLOWUPINSTRUCTIONS_ED_ALL_ED_FT
You were seen in the emergency department after a finger injury.    You can use 500-1000mg Tylenol every 6 hours for pain - as needed.  This is an over-the-counter medications - please respect the warnings on the label. This medication come with certain risks and side effects that you need to discuss with your doctor, especially if you are taking it for a prolonged period.    You can use 400-600mg Ibuprofen (such as motrin or advil) every 6 to 8 hours as needed for pain control.  Take ibuprofen with food or milk to lessen stomach upset.  This is an over-the-counter medication please respect the warnings on the label. All medications come with certain risks and side effects that you need to discuss with your doctor, especially if you are taking them for a prolonged period.    Please return the emergency department if you have any new or worsening symptoms including but not limited to worsening pain, pale appearance of fingers, decreased sensation, decreased movement.

## 2023-07-15 NOTE — ED PROVIDER NOTE - OBJECTIVE STATEMENT
37-year-old female history of diabetes, presents after right third and fourth distal finger injury while at work.  Patient closed door with on her fingers.  Endorses pain to distal right third and fourth, with no decreased motion or decrease sensation.  Has not taken medication for the pain at time.

## 2023-07-15 NOTE — ED PROVIDER NOTE - ATTENDING CONTRIBUTION TO CARE
Agree with above- RHD female p/w pain to fingertips of digits 3,4 after a door closed on her fingers by accident. Patient has good cap refill of all digits, sensation intact to light touch throughout, full flex and Extension of all IP joints and metacarpal joints, skin intact, minimal swelling without ecchymosis to fingertips, patient has acrylic nails on. No c/f for fracture or tendon injury given minimal TTP, full flex/ex of all IP joints. Pain control and dc

## 2025-05-06 ENCOUNTER — EMERGENCY (EMERGENCY)
Facility: HOSPITAL | Age: 40
LOS: 1 days | End: 2025-05-06
Admitting: EMERGENCY MEDICINE
Payer: COMMERCIAL

## 2025-05-06 VITALS
DIASTOLIC BLOOD PRESSURE: 67 MMHG | HEART RATE: 88 BPM | RESPIRATION RATE: 17 BRPM | TEMPERATURE: 98 F | SYSTOLIC BLOOD PRESSURE: 102 MMHG | OXYGEN SATURATION: 100 %

## 2025-05-06 VITALS
HEART RATE: 91 BPM | TEMPERATURE: 98 F | WEIGHT: 190.04 LBS | OXYGEN SATURATION: 98 % | RESPIRATION RATE: 16 BRPM | HEIGHT: 64 IN | DIASTOLIC BLOOD PRESSURE: 77 MMHG | SYSTOLIC BLOOD PRESSURE: 112 MMHG

## 2025-05-06 PROCEDURE — 73502 X-RAY EXAM HIP UNI 2-3 VIEWS: CPT | Mod: 26,LT

## 2025-05-06 PROCEDURE — 99284 EMERGENCY DEPT VISIT MOD MDM: CPT

## 2025-05-06 RX ORDER — IBUPROFEN 200 MG
600 TABLET ORAL ONCE
Refills: 0 | Status: COMPLETED | OUTPATIENT
Start: 2025-05-06 | End: 2025-05-06

## 2025-05-06 RX ADMIN — Medication 600 MILLIGRAM(S): at 20:00

## 2025-05-06 RX ADMIN — Medication 600 MILLIGRAM(S): at 21:00

## 2025-05-06 NOTE — ED PROVIDER NOTE - CLINICAL SUMMARY MEDICAL DECISION MAKING FREE TEXT BOX
39-year-old female with a past medical history of diabetes presents to the emergency department complaining of 6 months of intermittent left hip pain. Pt is well appearing, NAD, NV intact, MSK hip pain, will obtain xrays, pain control, follow up Orthopedics.

## 2025-05-06 NOTE — ED PROVIDER NOTE - WR ORDER DATE AND TIME 1
Benefits, risks, and possible complications of procedure explained to patient/caregiver who verbalized understanding and gave verbal consent.
06-May-2025 19:56

## 2025-05-06 NOTE — ED PROVIDER NOTE - NSFOLLOWUPINSTRUCTIONS_ED_ALL_ED_FT
Follow up with your Doctor in 1-2 days.    Follow up with Orthopedics in 1-2 days.(see attached list)  Take Ibuprofen 400mg orally every 6 hours as needed for pain take with food.    Return to the ER for any persistent/worsening or new symptoms, weakness, numbness, tingling or any concerning symptoms.

## 2025-05-06 NOTE — ED PROVIDER NOTE - PROGRESS NOTE DETAILS
MEMO Grant: pt feels better ambulating without difficulty.  Results reviewed with patient.  Discharge reviewed and discussed with patient.

## 2025-05-06 NOTE — ED ADULT NURSE NOTE - OBJECTIVE STATEMENT
Pt arrives to wellness. Pt is A and Ox4 and ambulatory. Hx: DM type2 . Pt arrives to the ED complaining of left hip x few months. Pt endorses the pain is atraumatic. Airway is patent, respirations are even and unlabored. Pt denies chest pain, shortness of breath, headaches, dizziness, numbness and tingling, fever and chills, nausea/vomitting/diarrhea. Skin is clean, dry, intact, and appropriate for race. Pt medicated per MAR. Plan of care ongoing, safety maintained.

## 2025-05-06 NOTE — ED PROVIDER NOTE - OBJECTIVE STATEMENT
39-year-old female with a past medical history of diabetes presents to the emergency department complaining of 6 months of intermittent left hip pain.  Patient denies fall, trauma, weakness, numbness, tingling, back pain, fevers, chills.

## 2025-05-06 NOTE — ED ADULT TRIAGE NOTE - CHIEF COMPLAINT QUOTE
pt. presents with c/o L sided hip pain exacerbated via walking, relieved when sitting down. pt. denies GI/ symptoms at this time. Hx of DM.

## 2025-05-06 NOTE — ED PROVIDER NOTE - PATIENT PORTAL LINK FT
You can access the FollowMyHealth Patient Portal offered by [facility] by registering at the following website: [email or web address]. By joining [employer]’s FollowMyHealth portal, you will also be able to view your health information using other applications (apps) compatible with our system.

## 2025-05-06 NOTE — ED PROVIDER NOTE - CONDITION AT DISCHARGE:
Testing like A1GDM  Fasting 70 per patient, 120s pp per patient  Needs diab edu, also needs repeat growth as last 32 wks to help guide control and delivery timing  Message sent to triage for both  IOL requested in 39th wk for baseline timing  Improved

## 2025-07-21 NOTE — ED PROVIDER NOTE - TEMPLATE, MLM
No  Thought Content: Compulsions  Hallucinations: None  Delusions: No  Memory:Normal: Yes  Insight and Judgment: Yes  Present Suicidal Ideation: Yes  Present Homicidal Ideation: No    Tobacco Screening:  Practical Counseling, on admission, jona X, if applicable and completed (first 3 are required if patient doesn't refuse): (x )  Recognizing danger situations (included triggers and roadblocks)                    (x )  Coping skills (new ways to manage stress, exercise, relaxation techniques, changing routine, distraction)                                                           ( x)  Basic information about quitting (benefits of quitting, techniques in how to quit, available resources  (x ) Referral for counseling faxed to [facility]                                           ( ) Patient refused counseling  ( ) Patient has not smoked in the last 30 days    Metabolic Screening:    No results found for: LABA1C    No results for input(s): CHOL, TRIG, HDL, LDLCALC, LABVLDL in the last 72 hours. Body mass index is 17.71 kg/m². BP Readings from Last 2 Encounters:   08/24/18 (!) 140/99   08/24/18 120/80           Pt admitted with followings belongings:  Dentures: None  Vision - Corrective Lenses: None  Hearing Aid: None  Jewelry: None  Body Piercings Removed: No  Clothing: None  Were All Patient Medications Collected?: No  Other Valuables: Cell phone  . Patient oriented to surroundings and program expectations and copy of patient rights given. Received admission packet:  *YES. Consents reviewed, signed yes. Refused N/A. Patient verbalize understanding:  yes.     Patient education on precautions: yes                   Carmita Mejia RN No indicators present General